# Patient Record
(demographics unavailable — no encounter records)

---

## 2024-11-14 NOTE — PLAN
[FreeTextEntry1] :  Patient screened for depression- no signs of clinical depression.  PHQ-9 scores reviewed over the course of the visit  5-10 minutes of face to face time spent.  Follow up with changes in mood including other symptoms of anxiety. RECOMMEND HORIZON ON PARTNER DISCUSSED EFFECTS OF AGE ON FERTILITY.  PT UNDERSTANDS RISKS AND WOULD NOT SEEK GUILLERMO EVAL IF PREGNANCY DOES NOT OCCUR

## 2024-11-14 NOTE — REASON FOR VISIT
[Annual] : an annual visit. [TextEntry] : ANNUAL EXAM.  NO GYN COMPLAINTS.  MENSES MONTHLY.  HAS NOT SERIOUSLY TRIED TO CONCEIVE.   RECENTLY LOST JOB AND HEALTH INSURANCE IS ENDING.  SEEN BY [facility] AND PMD-  LABS DRAWN AND WNL INCLUDING TSH BUT PT CONCERNED THAT HER HAIR IS NOT GROWING.  DENIES INC LOSS OF HAIR BUT FEELS THAT LENGTH BOCANEGRA NOT INCREASE.

## 2025-04-09 NOTE — PHYSICAL EXAM
[Appropriately responsive] : appropriately responsive [Alert] : alert [No Acute Distress] : no acute distress [Soft] : soft [Oriented x3] : oriented x3 [Labia Majora] : normal [Labia Minora] : normal [Normal] : normal [Uterine Adnexae] : normal [FreeTextEntry1] : small, healing pimple without drainage noted to outer right area of thigh

## 2025-04-09 NOTE — HISTORY OF PRESENT ILLNESS
[FreeTextEntry1] : Patient is a 38 year old, P0 , presenting for c/o fluid filled cysts/pimples in vaginal area, more on labia and thigh area that are painful when present.  Easily 'poppable'.  States that she finds this after she shaves.  Denies any drainage or bumps to area at this time.   LMP: 3/21/25   GYNHx: Denies recent abnl pap smears Denies fibroids/endometriosis/cysts Denies STIs   SexualHx: most recent encounter was a few months ago   Contraception: none   Occupation: [employer]

## 2025-04-09 NOTE — PLAN
[FreeTextEntry1] :  #Folliculitis - Discussed OTC pain relief as needed - Discussed benefits of warm compress to the area can help with pain.  - Discussed caises of folliculitis and importance of using clean razor in area - Discussed shaving techniques to avoid irritation - Discussed possible need for abx if area becomes infected - Reviewed possible need RTO if symptoms return - Patient to f/u in 1-2 weeks if symptoms worsen or remain bothersome - Topical relief sent to pharmacy as discussed. - All other questions and concerns addressed

## 2025-05-19 NOTE — ASSESSMENT
[FreeTextEntry1] : 39-year-old female with small resolving posterior thrombosed external hemorrhoid.  Plan:  Sitz bath's Proctosol cream 3 times daily fiber supplementation.  Bowel regimen while on Zepbound

## 2025-05-19 NOTE — HISTORY OF PRESENT ILLNESS
[FreeTextEntry1] : 38 yo female here for evaluation for hemorrhoids.  Pt reports having long hx of hemorrhoids. She states that her last episode was having pain from hemorrhoids was about 10 years ago. This past Thursday, she started feeling excruciating pain when having a BM. She uses Prep H, with a bit of relief. She has been taking zepbound, but doesn't believe that this is causing her any issues.  Pt denies abdominal pain, n/v, constipation, diarrhea, and bleeding. She has regular daily BMs.

## 2025-05-19 NOTE — PHYSICAL EXAM
[Normal Breath Sounds] : Normal breath sounds [Normal Heart Sounds] : normal heart sounds [Normal Rate and Rhythm] : normal rate and rhythm [No Rash or Lesion] : No rash or lesion [Alert] : alert [Oriented to Person] : oriented to person [Oriented to Place] : oriented to place [Oriented to Time] : oriented to time [Calm] : calm [Exam Deferred] : exam was deferred [None] : no anal fissures seen [Excoriation] : no perianal excoriation [Multiple Sinus Tracts] : no perianal sinus tracts [Fistula] : no fistulas [Wart] : no warts [Ulcer ___ cm] : no ulcers [Pilonidal Cyst] : no pilonidal cysts [Pilonidal Sinus] : no pilonidal sinus [Pilonidal Sinus Draining] : no pilonidal sinus drainage [Tender, Swollen] : tender, swollen [Thrombosed] : that was thrombosed [Skin Tags] : residual hemorrhoidal skin tags were noted [Normal] : was normal [de-identified] : soft, NT  [de-identified] : small resolving thrombosed posterior hemorrhoid [de-identified] : NAD [de-identified] : NC/AT [de-identified] : +ROM [de-identified] : intact